# Patient Record
Sex: FEMALE | Race: WHITE | NOT HISPANIC OR LATINO | URBAN - METROPOLITAN AREA
[De-identification: names, ages, dates, MRNs, and addresses within clinical notes are randomized per-mention and may not be internally consistent; named-entity substitution may affect disease eponyms.]

---

## 2023-06-12 ENCOUNTER — OFFICE VISIT (OUTPATIENT)
Dept: PRIMARY CARE | Facility: CLINIC | Age: 48
End: 2023-06-12
Payer: COMMERCIAL

## 2023-06-12 VITALS
TEMPERATURE: 97.8 F | HEART RATE: 73 BPM | DIASTOLIC BLOOD PRESSURE: 74 MMHG | OXYGEN SATURATION: 98 % | BODY MASS INDEX: 24.91 KG/M2 | HEIGHT: 66 IN | WEIGHT: 155 LBS | SYSTOLIC BLOOD PRESSURE: 130 MMHG

## 2023-06-12 DIAGNOSIS — Z12.11 ENCOUNTER FOR SCREENING FOR MALIGNANT NEOPLASM OF COLON: ICD-10-CM

## 2023-06-12 DIAGNOSIS — Z00.00 HEALTHCARE MAINTENANCE: Primary | ICD-10-CM

## 2023-06-12 PROBLEM — S06.0XAA CONCUSSION: Status: RESOLVED | Noted: 2023-06-12 | Resolved: 2023-06-12

## 2023-06-12 PROBLEM — L67.8: Status: ACTIVE | Noted: 2023-06-12

## 2023-06-12 PROBLEM — J01.90 ACUTE SINUSITIS: Status: ACTIVE | Noted: 2023-06-12

## 2023-06-12 PROBLEM — J20.9 ACUTE BRONCHITIS: Status: ACTIVE | Noted: 2023-06-12
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
## 2023-06-12 PROBLEM — J45.909 ASTHMA (HHS-HCC): Status: ACTIVE | Noted: 2023-06-12

## 2023-06-12 PROBLEM — S80.861A: Status: ACTIVE | Noted: 2023-06-12

## 2023-06-12 PROBLEM — T15.12XA: Status: ACTIVE | Noted: 2023-06-12

## 2023-06-12 PROBLEM — H52.223 REGULAR ASTIGMATISM OF BOTH EYES: Status: RESOLVED | Noted: 2023-06-12 | Resolved: 2023-06-12

## 2023-06-12 PROBLEM — J02.9 PHARYNGITIS: Status: RESOLVED | Noted: 2023-06-12 | Resolved: 2023-06-12

## 2023-06-12 PROBLEM — A09 INFECTIOUS GASTROENTERITIS: Status: RESOLVED | Noted: 2023-06-12 | Resolved: 2023-06-12

## 2023-06-12 PROBLEM — S80.861A: Status: RESOLVED | Noted: 2023-06-12 | Resolved: 2023-06-12

## 2023-06-12 PROBLEM — J32.9 CHRONIC SINUSITIS: Status: ACTIVE | Noted: 2023-06-12

## 2023-06-12 PROBLEM — A09 INFECTIOUS GASTROENTERITIS: Status: ACTIVE | Noted: 2023-06-12

## 2023-06-12 PROBLEM — L30.9 DERMATITIS: Status: ACTIVE | Noted: 2023-06-12

## 2023-06-12 PROBLEM — O36.80X0 ENCOUNTER TO DETERMINE FETAL VIABILITY OF PREGNANCY (HHS-HCC): Status: RESOLVED | Noted: 2023-06-12 | Resolved: 2023-06-12

## 2023-06-12 PROBLEM — L67.9 HAIR CHANGES: Status: RESOLVED | Noted: 2023-06-12 | Resolved: 2023-06-12

## 2023-06-12 PROBLEM — J20.9 ACUTE BRONCHITIS: Status: RESOLVED | Noted: 2023-06-12 | Resolved: 2023-06-12

## 2023-06-12 PROBLEM — I73.00 RAYNAUD PHENOMENON: Status: ACTIVE | Noted: 2023-06-12
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
## 2023-06-12 PROBLEM — L67.9 HAIR CHANGES: Status: ACTIVE | Noted: 2023-06-12

## 2023-06-12 PROBLEM — T15.12XA: Status: RESOLVED | Noted: 2023-06-12 | Resolved: 2023-06-12

## 2023-06-12 PROBLEM — N97.9 FEMALE INFERTILITY: Status: ACTIVE | Noted: 2023-06-12

## 2023-06-12 PROBLEM — R30.0 DYSURIA: Status: RESOLVED | Noted: 2023-06-12 | Resolved: 2023-06-12

## 2023-06-12 PROBLEM — L30.9 DERMATITIS: Status: RESOLVED | Noted: 2023-06-12 | Resolved: 2023-06-12

## 2023-06-12 PROBLEM — L50.8 URTICARIA, ACUTE: Status: ACTIVE | Noted: 2023-06-12

## 2023-06-12 PROBLEM — S06.0XAA CONCUSSION: Status: ACTIVE | Noted: 2023-06-12

## 2023-06-12 PROBLEM — I73.00 RAYNAUD PHENOMENON: Status: RESOLVED | Noted: 2023-06-12 | Resolved: 2023-06-12

## 2023-06-12 PROBLEM — N97.9 FEMALE INFERTILITY: Status: RESOLVED | Noted: 2023-06-12 | Resolved: 2023-06-12

## 2023-06-12 PROBLEM — H52.223 REGULAR ASTIGMATISM OF BOTH EYES: Status: ACTIVE | Noted: 2023-06-12

## 2023-06-12 PROBLEM — O36.80X0 ENCOUNTER TO DETERMINE FETAL VIABILITY OF PREGNANCY (HHS-HCC): Status: ACTIVE | Noted: 2023-06-12

## 2023-06-12 PROBLEM — L67.8: Status: RESOLVED | Noted: 2023-06-12 | Resolved: 2023-06-12

## 2023-06-12 PROBLEM — R30.0 DYSURIA: Status: ACTIVE | Noted: 2023-06-12

## 2023-06-12 PROBLEM — J02.9 PHARYNGITIS: Status: ACTIVE | Noted: 2023-06-12

## 2023-06-12 PROBLEM — J01.90 ACUTE SINUSITIS: Status: RESOLVED | Noted: 2023-06-12 | Resolved: 2023-06-12

## 2023-06-12 PROCEDURE — 99396 PREV VISIT EST AGE 40-64: CPT | Performed by: STUDENT IN AN ORGANIZED HEALTH CARE EDUCATION/TRAINING PROGRAM

## 2023-06-12 PROCEDURE — 1036F TOBACCO NON-USER: CPT | Performed by: STUDENT IN AN ORGANIZED HEALTH CARE EDUCATION/TRAINING PROGRAM

## 2023-06-12 RX ORDER — OLOPATADINE HYDROCHLORIDE 665 UG/1
SPRAY NASAL
COMMUNITY
Start: 2023-01-28

## 2023-06-12 RX ORDER — HYDROXYZINE PAMOATE 25 MG/1
CAPSULE ORAL
COMMUNITY
Start: 2021-12-27

## 2023-06-12 RX ORDER — LEVONORGESTREL AND ETHINYL ESTRADIOL 0.1-0.02MG
1 KIT ORAL DAILY
COMMUNITY

## 2023-06-12 RX ORDER — ALBUTEROL SULFATE 90 UG/1
AEROSOL, METERED RESPIRATORY (INHALATION)
COMMUNITY
Start: 2012-06-20

## 2023-06-12 RX ORDER — MUPIROCIN 20 MG/G
OINTMENT TOPICAL
COMMUNITY
Start: 2023-06-08

## 2023-06-12 RX ORDER — MONTELUKAST SODIUM 10 MG/1
10 TABLET ORAL DAILY
COMMUNITY
Start: 2023-05-08

## 2023-06-12 RX ORDER — BUDESONIDE 0.5 MG/2ML
INHALANT ORAL
COMMUNITY
Start: 2023-05-15

## 2023-06-12 RX ORDER — AZELASTINE HCL 205.5 UG/1
SPRAY, METERED NASAL
COMMUNITY
Start: 2022-12-27

## 2023-06-12 RX ORDER — FLUTICASONE PROPIONATE 50 MCG
SPRAY, SUSPENSION (ML) NASAL
COMMUNITY
Start: 2023-02-10

## 2023-06-12 RX ORDER — TRIAMCINOLONE ACETONIDE 1 MG/G
CREAM TOPICAL
COMMUNITY
Start: 2018-06-19

## 2023-06-12 RX ORDER — FLUTICASONE FUROATE AND VILANTEROL TRIFENATATE 200; 25 UG/1; UG/1
POWDER RESPIRATORY (INHALATION)
COMMUNITY
Start: 2023-04-02

## 2023-06-12 NOTE — PATIENT INSTRUCTIONS
Please stop at the lab ([address]) to complete your blood and/or urine work that I've ordered for you.    I will contact you with the results at my soonest convenience. I strongly urge you to use "[employer] as this is the quickest and easiest way to access your results and recieve my correspondences.    I have ordered Cologuard to screen you for colon cancer. You will receive a kit at home.     See me yearly for a complete physical exam, and sooner as needed for sick visits

## 2023-06-12 NOTE — PROGRESS NOTES
"Subjective   Patient ID: Elisa H Siegler is a 48 y.o. female who presents for Annual Exam.    HPI   Has no acute complaints today. Here for annual visit. Patient exercises vigorously and is concerned that she's plateaued weight wise. She asks for consideration of GLP-1, however her BMI is not above the threshold.     PMHx, FHx, Social Hx, Surg Hx personally reviewed at this appointment. No pertinent findings and/or changes from prior (if applicable).     ROS: Denies wt gain/loss f/c HA LoC CP SOB NVDC. See HPI above, and scanned sheet (if applicable). All other systems are reviewed and are without complaint.     Review of Systems    Objective   /74   Pulse 73   Temp 36.6 °C (97.8 °F)   Ht 1.676 m (5' 6\")   Wt 70.3 kg (155 lb)   SpO2 98%   BMI 25.02 kg/m²     Physical Exam  Gen: well developed in NAD. AAO x3.  HEENT: NC/AT. Anicteric sclera, symmetric pupils. MMM no thrush.  Neck: Soft, supple. No LAD. No goiter.   CV: RRR nl s1s2 no m/r/g  Pulm: CTAB no w/r/r, good air exchange  GI: soft NTND BS+ no hsm  Ext: WWP no edema  Neuro: II-XII grossly intact, nonfocal systemic findings  MSK: 5/5 strength b/l UE and LE  Gait: unremarkable    Assessment/Plan   In great health.    # Health Maintenance  - routine blood work  - Colon Cancer Screening: due, cologuard ordered  - Mammogram: UTD  - DEXA: Not yet indicated   - Immunizations: UTD  Problem List Items Addressed This Visit    None  Visit Diagnoses       Healthcare maintenance    -  Primary    Relevant Orders    CBC and Auto Differential    Comprehensive Metabolic Panel    Lipid Panel    TSH with reflex to Free T4 if abnormal    Vitamin D, Total    Hemoglobin A1C    Encounter for screening for malignant neoplasm of colon        Relevant Orders    Cologuard® colon cancer screening               "

## 2023-06-13 ENCOUNTER — LAB (OUTPATIENT)
Dept: LAB | Facility: LAB | Age: 48
End: 2023-06-13
Payer: COMMERCIAL

## 2023-06-13 DIAGNOSIS — Z00.00 HEALTHCARE MAINTENANCE: ICD-10-CM

## 2023-06-13 LAB
ALANINE AMINOTRANSFERASE (SGPT) (U/L) IN SER/PLAS: 14 U/L (ref 7–45)
ALBUMIN (G/DL) IN SER/PLAS: 4.1 G/DL (ref 3.4–5)
ALKALINE PHOSPHATASE (U/L) IN SER/PLAS: 43 U/L (ref 33–110)
ANION GAP IN SER/PLAS: 8 MMOL/L (ref 10–20)
ASPARTATE AMINOTRANSFERASE (SGOT) (U/L) IN SER/PLAS: 21 U/L (ref 9–39)
BASOPHILS (10*3/UL) IN BLOOD BY AUTOMATED COUNT: 0.04 X10E9/L (ref 0–0.1)
BASOPHILS/100 LEUKOCYTES IN BLOOD BY AUTOMATED COUNT: 0.6 % (ref 0–2)
BILIRUBIN TOTAL (MG/DL) IN SER/PLAS: 1 MG/DL (ref 0–1.2)
CALCIDIOL (25 OH VITAMIN D3) (NG/ML) IN SER/PLAS: 66 NG/ML
CALCIUM (MG/DL) IN SER/PLAS: 9.4 MG/DL (ref 8.6–10.6)
CARBON DIOXIDE, TOTAL (MMOL/L) IN SER/PLAS: 31 MMOL/L (ref 21–32)
CHLORIDE (MMOL/L) IN SER/PLAS: 104 MMOL/L (ref 98–107)
CHOLESTEROL (MG/DL) IN SER/PLAS: 303 MG/DL (ref 0–199)
CHOLESTEROL IN HDL (MG/DL) IN SER/PLAS: 69.4 MG/DL
CHOLESTEROL/HDL RATIO: 4.4
CREATININE (MG/DL) IN SER/PLAS: 0.83 MG/DL (ref 0.5–1.05)
EOSINOPHILS (10*3/UL) IN BLOOD BY AUTOMATED COUNT: 0.1 X10E9/L (ref 0–0.7)
EOSINOPHILS/100 LEUKOCYTES IN BLOOD BY AUTOMATED COUNT: 1.4 % (ref 0–6)
ERYTHROCYTE DISTRIBUTION WIDTH (RATIO) BY AUTOMATED COUNT: 13.4 % (ref 11.5–14.5)
ERYTHROCYTE MEAN CORPUSCULAR HEMOGLOBIN CONCENTRATION (G/DL) BY AUTOMATED: 32 G/DL (ref 32–36)
ERYTHROCYTE MEAN CORPUSCULAR VOLUME (FL) BY AUTOMATED COUNT: 92 FL (ref 80–100)
ERYTHROCYTES (10*6/UL) IN BLOOD BY AUTOMATED COUNT: 4.52 X10E12/L (ref 4–5.2)
ESTIMATED AVERAGE GLUCOSE FOR HBA1C: 111 MG/DL
GFR FEMALE: 86 ML/MIN/1.73M2
GLUCOSE (MG/DL) IN SER/PLAS: 86 MG/DL (ref 74–99)
HEMATOCRIT (%) IN BLOOD BY AUTOMATED COUNT: 41.6 % (ref 36–46)
HEMOGLOBIN (G/DL) IN BLOOD: 13.3 G/DL (ref 12–16)
HEMOGLOBIN A1C/HEMOGLOBIN TOTAL IN BLOOD: 5.5 %
IMMATURE GRANULOCYTES/100 LEUKOCYTES IN BLOOD BY AUTOMATED COUNT: 0.3 % (ref 0–0.9)
LDL: 207 MG/DL (ref 0–99)
LEUKOCYTES (10*3/UL) IN BLOOD BY AUTOMATED COUNT: 7 X10E9/L (ref 4.4–11.3)
LYMPHOCYTES (10*3/UL) IN BLOOD BY AUTOMATED COUNT: 2.6 X10E9/L (ref 1.2–4.8)
LYMPHOCYTES/100 LEUKOCYTES IN BLOOD BY AUTOMATED COUNT: 37 % (ref 13–44)
MONOCYTES (10*3/UL) IN BLOOD BY AUTOMATED COUNT: 0.42 X10E9/L (ref 0.1–1)
MONOCYTES/100 LEUKOCYTES IN BLOOD BY AUTOMATED COUNT: 6 % (ref 2–10)
NEUTROPHILS (10*3/UL) IN BLOOD BY AUTOMATED COUNT: 3.85 X10E9/L (ref 1.2–7.7)
NEUTROPHILS/100 LEUKOCYTES IN BLOOD BY AUTOMATED COUNT: 54.7 % (ref 40–80)
NRBC (PER 100 WBCS) BY AUTOMATED COUNT: 0 /100 WBC (ref 0–0)
PLATELETS (10*3/UL) IN BLOOD AUTOMATED COUNT: 326 X10E9/L (ref 150–450)
POTASSIUM (MMOL/L) IN SER/PLAS: 4.4 MMOL/L (ref 3.5–5.3)
PROTEIN TOTAL: 7.1 G/DL (ref 6.4–8.2)
SODIUM (MMOL/L) IN SER/PLAS: 139 MMOL/L (ref 136–145)
THYROTROPIN (MIU/L) IN SER/PLAS BY DETECTION LIMIT <= 0.05 MIU/L: 1.69 MIU/L (ref 0.44–3.98)
TRIGLYCERIDE (MG/DL) IN SER/PLAS: 134 MG/DL (ref 0–149)
UREA NITROGEN (MG/DL) IN SER/PLAS: 19 MG/DL (ref 6–23)
VLDL: 27 MG/DL (ref 0–40)

## 2023-06-13 PROCEDURE — 85025 COMPLETE CBC W/AUTO DIFF WBC: CPT

## 2023-06-13 PROCEDURE — 84443 ASSAY THYROID STIM HORMONE: CPT

## 2023-06-13 PROCEDURE — 36415 COLL VENOUS BLD VENIPUNCTURE: CPT

## 2023-06-13 PROCEDURE — 80053 COMPREHEN METABOLIC PANEL: CPT

## 2023-06-13 PROCEDURE — 83036 HEMOGLOBIN GLYCOSYLATED A1C: CPT

## 2023-06-13 PROCEDURE — 82306 VITAMIN D 25 HYDROXY: CPT

## 2023-06-13 PROCEDURE — 80061 LIPID PANEL: CPT

## 2023-06-15 DIAGNOSIS — E78.01 FAMILIAL HYPERCHOLESTEROLEMIA: Primary | ICD-10-CM

## 2023-06-15 RX ORDER — ATORVASTATIN CALCIUM 40 MG/1
40 TABLET, FILM COATED ORAL DAILY
Qty: 90 TABLET | Refills: 3 | Status: SHIPPED | OUTPATIENT
Start: 2023-06-15 | End: 2024-06-09

## 2023-06-15 NOTE — PROGRESS NOTES
LDL greater than 190 again.    Once again extensive discussion about starting a statin. Will start Atorvastatin 40mg. Will repeat lab work in ~4 weeks to check CMP and lipids.

## 2023-07-04 LAB — NONINV COLON CA DNA+OCC BLD SCRN STL QL: NORMAL

## 2023-07-25 LAB — NONINV COLON CA DNA+OCC BLD SCRN STL QL: NEGATIVE

## 2023-10-12 ENCOUNTER — CLINICAL SUPPORT (OUTPATIENT)
Dept: PEDIATRICS | Facility: CLINIC | Age: 48
End: 2023-10-12
Payer: COMMERCIAL

## 2023-10-12 DIAGNOSIS — Z23 FLU VACCINE NEED: Primary | ICD-10-CM

## 2023-10-12 PROCEDURE — 90471 IMMUNIZATION ADMIN: CPT | Performed by: PEDIATRICS

## 2023-10-12 PROCEDURE — 90686 IIV4 VACC NO PRSV 0.5 ML IM: CPT | Performed by: PEDIATRICS

## 2024-01-02 ENCOUNTER — LAB (OUTPATIENT)
Dept: LAB | Facility: LAB | Age: 49
End: 2024-01-02
Payer: COMMERCIAL

## 2024-01-02 DIAGNOSIS — E78.01 FAMILIAL HYPERCHOLESTEROLEMIA: ICD-10-CM

## 2024-01-02 LAB
ALBUMIN SERPL BCP-MCNC: 4 G/DL (ref 3.4–5)
ALP SERPL-CCNC: 47 U/L (ref 33–110)
ALT SERPL W P-5'-P-CCNC: 11 U/L (ref 7–45)
ANION GAP SERPL CALC-SCNC: 11 MMOL/L (ref 10–20)
AST SERPL W P-5'-P-CCNC: 18 U/L (ref 9–39)
BILIRUB SERPL-MCNC: 0.6 MG/DL (ref 0–1.2)
BUN SERPL-MCNC: 20 MG/DL (ref 6–23)
CALCIUM SERPL-MCNC: 8.9 MG/DL (ref 8.6–10.6)
CHLORIDE SERPL-SCNC: 106 MMOL/L (ref 98–107)
CHOLEST SERPL-MCNC: 283 MG/DL (ref 0–199)
CHOLESTEROL/HDL RATIO: 4.1
CO2 SERPL-SCNC: 28 MMOL/L (ref 21–32)
CREAT SERPL-MCNC: 0.77 MG/DL (ref 0.5–1.05)
GFR SERPL CREATININE-BSD FRML MDRD: >90 ML/MIN/1.73M*2
GLUCOSE SERPL-MCNC: 85 MG/DL (ref 74–99)
HDLC SERPL-MCNC: 68.9 MG/DL
LDLC SERPL CALC-MCNC: 190 MG/DL
NON HDL CHOLESTEROL: 214 MG/DL (ref 0–149)
POTASSIUM SERPL-SCNC: 4.2 MMOL/L (ref 3.5–5.3)
PROT SERPL-MCNC: 6.4 G/DL (ref 6.4–8.2)
SODIUM SERPL-SCNC: 141 MMOL/L (ref 136–145)
TRIGL SERPL-MCNC: 121 MG/DL (ref 0–149)
VLDL: 24 MG/DL (ref 0–40)

## 2024-01-02 PROCEDURE — 80053 COMPREHEN METABOLIC PANEL: CPT

## 2024-01-02 PROCEDURE — 80061 LIPID PANEL: CPT

## 2024-01-02 PROCEDURE — 36415 COLL VENOUS BLD VENIPUNCTURE: CPT

## 2024-01-09 ENCOUNTER — TELEMEDICINE (OUTPATIENT)
Dept: PRIMARY CARE | Facility: CLINIC | Age: 49
End: 2024-01-09
Payer: COMMERCIAL

## 2024-01-09 DIAGNOSIS — E78.49 FAMILIAL COMBINED HYPERLIPIDEMIA: Primary | ICD-10-CM

## 2024-01-09 PROCEDURE — 99213 OFFICE O/P EST LOW 20 MIN: CPT | Performed by: STUDENT IN AN ORGANIZED HEALTH CARE EDUCATION/TRAINING PROGRAM

## 2024-01-09 NOTE — PROGRESS NOTES
Subjective   Patient ID: Elisa H Siegler is a 48 y.o. female who presents for No chief complaint on file..    HPI   Re: HLD - Familial HLD. LDL >190. She's known this for years. She is adamant that she won't go on a statin as she's concerned about side effects. Extensive talk today about how ASCVD % is irrelevant as her LDL is >190, she wishes to continue with only conservative lifestyle measures.     Review of Systems    Objective   There were no vitals taken for this visit.    Physical Exam  Gen: Well appearing, AAO x 3.   HEENT: NC/AT. Symmetric pupils on webcam.   Pulm: no evidence of increased work of breathing on webcam  Skin: no blemishes or rashes on webcam       Assessment/Plan   # hyperlipidemia: very elevated, > 190  - pt declines statin  - recommend cardiology consult if she wishes to obtain a second opinion

## 2024-01-15 ENCOUNTER — APPOINTMENT (OUTPATIENT)
Dept: PRIMARY CARE | Facility: CLINIC | Age: 49
End: 2024-01-15
Payer: COMMERCIAL

## 2024-02-18 ENCOUNTER — PATIENT MESSAGE (OUTPATIENT)
Dept: PRIMARY CARE | Facility: CLINIC | Age: 49
End: 2024-02-18
Payer: COMMERCIAL

## 2024-02-18 DIAGNOSIS — E78.49 FAMILIAL COMBINED HYPERLIPIDEMIA: Primary | ICD-10-CM

## 2024-02-19 ENCOUNTER — APPOINTMENT (OUTPATIENT)
Dept: PRIMARY CARE | Facility: CLINIC | Age: 49
End: 2024-02-19
Payer: COMMERCIAL

## 2024-04-19 ENCOUNTER — HOSPITAL ENCOUNTER (OUTPATIENT)
Dept: RADIOLOGY | Facility: CLINIC | Age: 49
Discharge: HOME | End: 2024-04-19
Payer: COMMERCIAL

## 2024-04-19 DIAGNOSIS — E78.49 FAMILIAL COMBINED HYPERLIPIDEMIA: ICD-10-CM

## 2024-04-19 PROCEDURE — 75571 CT HRT W/O DYE W/CA TEST: CPT

## 2024-06-02 ENCOUNTER — LAB REQUISITION (OUTPATIENT)
Dept: LAB | Facility: HOSPITAL | Age: 49
End: 2024-06-02
Payer: COMMERCIAL

## 2024-06-02 DIAGNOSIS — J01.90 ACUTE SINUSITIS, UNSPECIFIED: ICD-10-CM

## 2024-06-02 PROCEDURE — 87651 STREP A DNA AMP PROBE: CPT

## 2024-06-03 LAB — S PYO DNA THROAT QL NAA+PROBE: NOT DETECTED

## 2024-06-13 ENCOUNTER — APPOINTMENT (OUTPATIENT)
Dept: PRIMARY CARE | Facility: CLINIC | Age: 49
End: 2024-06-13
Payer: COMMERCIAL

## 2024-06-17 DIAGNOSIS — E78.49 FAMILIAL COMBINED HYPERLIPIDEMIA: Primary | ICD-10-CM

## 2024-06-17 DIAGNOSIS — Z00.00 HEALTHCARE MAINTENANCE: ICD-10-CM

## 2024-06-18 ENCOUNTER — LAB (OUTPATIENT)
Dept: LAB | Facility: LAB | Age: 49
End: 2024-06-18
Payer: COMMERCIAL

## 2024-06-18 DIAGNOSIS — Z00.00 HEALTHCARE MAINTENANCE: ICD-10-CM

## 2024-06-18 DIAGNOSIS — E78.49 FAMILIAL COMBINED HYPERLIPIDEMIA: ICD-10-CM

## 2024-06-18 LAB
ALBUMIN SERPL BCP-MCNC: 4.1 G/DL (ref 3.4–5)
ALP SERPL-CCNC: 60 U/L (ref 33–110)
ALT SERPL W P-5'-P-CCNC: 12 U/L (ref 7–45)
ANION GAP SERPL CALC-SCNC: 12 MMOL/L (ref 10–20)
AST SERPL W P-5'-P-CCNC: 21 U/L (ref 9–39)
BASOPHILS # BLD AUTO: 0.04 X10*3/UL (ref 0–0.1)
BASOPHILS NFR BLD AUTO: 0.7 %
BILIRUB SERPL-MCNC: 0.7 MG/DL (ref 0–1.2)
BUN SERPL-MCNC: 17 MG/DL (ref 6–23)
CALCIUM SERPL-MCNC: 9 MG/DL (ref 8.6–10.6)
CHLORIDE SERPL-SCNC: 103 MMOL/L (ref 98–107)
CHOLEST SERPL-MCNC: 276 MG/DL (ref 0–199)
CHOLESTEROL/HDL RATIO: 4.8
CO2 SERPL-SCNC: 27 MMOL/L (ref 21–32)
CREAT SERPL-MCNC: 0.72 MG/DL (ref 0.5–1.05)
EGFRCR SERPLBLD CKD-EPI 2021: >90 ML/MIN/1.73M*2
EOSINOPHIL # BLD AUTO: 0.08 X10*3/UL (ref 0–0.7)
EOSINOPHIL NFR BLD AUTO: 1.5 %
ERYTHROCYTE [DISTWIDTH] IN BLOOD BY AUTOMATED COUNT: 13.3 % (ref 11.5–14.5)
GLUCOSE SERPL-MCNC: 65 MG/DL (ref 74–99)
HCT VFR BLD AUTO: 40.5 % (ref 36–46)
HDLC SERPL-MCNC: 57.1 MG/DL
HGB BLD-MCNC: 13 G/DL (ref 12–16)
IMM GRANULOCYTES # BLD AUTO: 0.01 X10*3/UL (ref 0–0.7)
IMM GRANULOCYTES NFR BLD AUTO: 0.2 % (ref 0–0.9)
LDLC SERPL CALC-MCNC: 202 MG/DL
LYMPHOCYTES # BLD AUTO: 2.44 X10*3/UL (ref 1.2–4.8)
LYMPHOCYTES NFR BLD AUTO: 44.9 %
MCH RBC QN AUTO: 29.6 PG (ref 26–34)
MCHC RBC AUTO-ENTMCNC: 32.1 G/DL (ref 32–36)
MCV RBC AUTO: 92 FL (ref 80–100)
MONOCYTES # BLD AUTO: 0.53 X10*3/UL (ref 0.1–1)
MONOCYTES NFR BLD AUTO: 9.7 %
NEUTROPHILS # BLD AUTO: 2.34 X10*3/UL (ref 1.2–7.7)
NEUTROPHILS NFR BLD AUTO: 43 %
NON HDL CHOLESTEROL: 219 MG/DL (ref 0–149)
NRBC BLD-RTO: 0 /100 WBCS (ref 0–0)
PLATELET # BLD AUTO: 298 X10*3/UL (ref 150–450)
POTASSIUM SERPL-SCNC: 4.3 MMOL/L (ref 3.5–5.3)
PROT SERPL-MCNC: 6.6 G/DL (ref 6.4–8.2)
RBC # BLD AUTO: 4.39 X10*6/UL (ref 4–5.2)
SODIUM SERPL-SCNC: 138 MMOL/L (ref 136–145)
TRIGL SERPL-MCNC: 87 MG/DL (ref 0–149)
VLDL: 17 MG/DL (ref 0–40)
WBC # BLD AUTO: 5.4 X10*3/UL (ref 4.4–11.3)

## 2024-06-18 PROCEDURE — 36415 COLL VENOUS BLD VENIPUNCTURE: CPT

## 2024-06-18 PROCEDURE — 85025 COMPLETE CBC W/AUTO DIFF WBC: CPT

## 2024-06-18 PROCEDURE — 80053 COMPREHEN METABOLIC PANEL: CPT

## 2024-06-18 PROCEDURE — 80061 LIPID PANEL: CPT

## 2024-06-20 ENCOUNTER — APPOINTMENT (OUTPATIENT)
Dept: PRIMARY CARE | Facility: CLINIC | Age: 49
End: 2024-06-20
Payer: COMMERCIAL

## 2024-06-20 VITALS
BODY MASS INDEX: 24.66 KG/M2 | SYSTOLIC BLOOD PRESSURE: 117 MMHG | TEMPERATURE: 96.9 F | HEIGHT: 62 IN | OXYGEN SATURATION: 98 % | HEART RATE: 73 BPM | DIASTOLIC BLOOD PRESSURE: 73 MMHG | WEIGHT: 134 LBS

## 2024-06-20 DIAGNOSIS — E78.49 FAMILIAL COMBINED HYPERLIPIDEMIA: Primary | ICD-10-CM

## 2024-06-20 DIAGNOSIS — Z00.00 HEALTHCARE MAINTENANCE: ICD-10-CM

## 2024-06-20 DIAGNOSIS — J45.20 MILD INTERMITTENT ASTHMA WITHOUT COMPLICATION (HHS-HCC): ICD-10-CM

## 2024-06-20 PROCEDURE — 99396 PREV VISIT EST AGE 40-64: CPT | Performed by: STUDENT IN AN ORGANIZED HEALTH CARE EDUCATION/TRAINING PROGRAM

## 2024-06-20 PROCEDURE — 1036F TOBACCO NON-USER: CPT | Performed by: STUDENT IN AN ORGANIZED HEALTH CARE EDUCATION/TRAINING PROGRAM

## 2024-06-20 ASSESSMENT — PAIN SCALES - GENERAL: PAINLEVEL: 0-NO PAIN

## 2024-06-20 NOTE — PROGRESS NOTES
"Subjective   Patient ID: Elisa H Siegler is a 49 y.o. female who presents for No chief complaint on file..    HPI     Doing great since last in!    Re: wt - BMI now less than 25. In with a wellness clinic. On tirzapetide through a compounding pharmacy. Diet and exercise are goodl    Re: HLD - LDL > 190. She remains adamant on not wanting a statin. Willing to meet with cardiology to discuss if there is a different class of medication (ex; Repatha) she will qualify for.     Re: HM - shots UTD. CRS and Mamm UTD.     Review of Systems    Objective   /73   Pulse 73   Temp 36.1 °C (96.9 °F)   Ht 1.575 m (5' 2\")   Wt 60.8 kg (134 lb)   SpO2 98%   BMI 24.51 kg/m²     Physical Exam  Gen: well developed in NAD. AAO x3.  HEENT: NC/AT. Anicteric sclera, symmetric pupils. MMM no thrush.  Neck: Soft, supple. No LAD. No goiter.   CV: RRR nl s1s2 no m/r/g  Pulm: CTAB no w/r/r, good air exchange  GI: soft NTND BS+ no hsm  Ext: WWP no edema  Neuro: II-XII grossly intact, nonfocal systemic findings  MSK: 5/5 strength b/l UE and LE  Gait: unremarkable    Lab Results   Component Value Date    WBC 5.4 06/18/2024    HGB 13.0 06/18/2024    HCT 40.5 06/18/2024     06/18/2024    CHOL 276 (H) 06/18/2024    TRIG 87 06/18/2024    HDL 57.1 06/18/2024    ALT 12 06/18/2024    AST 21 06/18/2024     06/18/2024    K 4.3 06/18/2024     06/18/2024    CREATININE 0.72 06/18/2024    BUN 17 06/18/2024    CO2 27 06/18/2024    TSH 1.69 06/13/2023    HGBA1C 5.5 06/13/2023     par       Assessment/Plan   In great health.     # LDL > 190  - referral to Cardiology (Alvarez King) to discuss lipid lowering treatment    # Health Maintenance  - routine blood work  - Colon Cancer Screening: UTD, repeat 2026 (Cologuard)  - Mammogram: UTD  - DEXA: Not yet indicated   - Immunizations: UTD         "

## 2024-06-20 NOTE — PATIENT INSTRUCTIONS
Please stop at the lab ([address]) to complete your blood and/or urine work that I've ordered for you.    I will contact you with the results at my soonest convenience. I strongly urge you to use [employer] as this is the quickest and easiest way to access your results and receive my correspondences.    I have referred you to a Dr. Alvarez King, a cardiologist for further discussion about lowering your LDL.    See me yearly for a complete physical exam, and sooner as needed for sick visits

## 2024-08-13 RX ORDER — TRETINOIN 0.25 MG/G
CREAM TOPICAL
COMMUNITY
Start: 2024-06-17

## 2024-08-21 ENCOUNTER — OFFICE VISIT (OUTPATIENT)
Dept: CARDIOLOGY | Facility: CLINIC | Age: 49
End: 2024-08-21
Payer: COMMERCIAL

## 2024-08-21 VITALS
DIASTOLIC BLOOD PRESSURE: 87 MMHG | WEIGHT: 125 LBS | HEIGHT: 62 IN | BODY MASS INDEX: 23 KG/M2 | SYSTOLIC BLOOD PRESSURE: 130 MMHG | HEART RATE: 77 BPM | OXYGEN SATURATION: 100 %

## 2024-08-21 DIAGNOSIS — E78.49 FAMILIAL COMBINED HYPERLIPIDEMIA: Primary | ICD-10-CM

## 2024-08-21 DIAGNOSIS — E78.5 HYPERLIPIDEMIA, UNSPECIFIED HYPERLIPIDEMIA TYPE: ICD-10-CM

## 2024-08-21 PROCEDURE — 93005 ELECTROCARDIOGRAM TRACING: CPT | Performed by: INTERNAL MEDICINE

## 2024-08-21 PROCEDURE — 1036F TOBACCO NON-USER: CPT | Performed by: INTERNAL MEDICINE

## 2024-08-21 PROCEDURE — 99214 OFFICE O/P EST MOD 30 MIN: CPT | Performed by: INTERNAL MEDICINE

## 2024-08-21 PROCEDURE — 3008F BODY MASS INDEX DOCD: CPT | Performed by: INTERNAL MEDICINE

## 2024-08-21 PROCEDURE — 99204 OFFICE O/P NEW MOD 45 MIN: CPT | Performed by: INTERNAL MEDICINE

## 2024-08-21 RX ORDER — ROSUVASTATIN CALCIUM 10 MG/1
10 TABLET, COATED ORAL DAILY
Qty: 30 TABLET | Refills: 11 | Status: SHIPPED | OUTPATIENT
Start: 2024-08-21 | End: 2025-08-21

## 2024-08-21 ASSESSMENT — ENCOUNTER SYMPTOMS
GASTROINTESTINAL NEGATIVE: 1
PSYCHIATRIC NEGATIVE: 1
CARDIOVASCULAR NEGATIVE: 1
HEMATOLOGIC/LYMPHATIC NEGATIVE: 1
RESPIRATORY NEGATIVE: 1
CONSTITUTIONAL NEGATIVE: 1
MUSCULOSKELETAL NEGATIVE: 1
NEUROLOGICAL NEGATIVE: 1
EYES NEGATIVE: 1
ENDOCRINE NEGATIVE: 1
ALLERGIC/IMMUNOLOGIC NEGATIVE: 1

## 2024-08-21 ASSESSMENT — PAIN SCALES - GENERAL: PAINLEVEL: 0-NO PAIN

## 2024-08-21 NOTE — PROGRESS NOTES
Preventive Cardiology Clinic Note    Reason for Visit: Severe hypercholesterolemia  Referring Clinician: Andreas     History of Present Illness: Elisa H Siegler is a 49 y.o. female with severe hypercholesterolemia and possible familial hypercholesterolemia with history of intolerance to atorvastatin who was referred by her PCP to discuss lipid-lowering options to reduce her cardiovascular risk.  She does not have any other chronic medical conditions besides asthma and she does not report any cardiovascular symptoms such as chest pain, shortness of breath, palpitations, or syncope.  She has had high cholesterol for many years and has tried lifestyle modification without significant change in her levels.  She was on atorvastatin for 1 week but developed muscle aches did not feel well on the medication so stopped it.  She is generally hesitant to go on medication use medication long-term.  There is no history of premature coronary artery disease in her family although her uncle did have coronary bypass surgery and both of her parents have high cholesterol.  She is a non-smoker.  She exercises regularly without cardiopulmonary limitation.  She had a coronary artery calcium score of 0.    Past Medical History:  She has a past medical history of Personal history of other diseases of the respiratory system.    Past Surgical History:  She has a past surgical history that includes Mandible surgery (01/19/2015).    Social History:  She reports that she has never smoked. She has never used smokeless tobacco. She reports that she does not drink alcohol and does not use drugs.    Family History:  Family history as above in the HPI.    Allergies:  Molds extract, Grass pollen, Amoxicillin, Corn, and Oats    Outpatient Medications:  Current Outpatient Medications   Medication Instructions    albuterol (ProAir HFA) 90 mcg/actuation inhaler inhalation    budesonide (Pulmicort) 0.5 mg/2 mL nebulizer solution     fluticasone (Flonase)  "50 mcg/actuation nasal spray MILLILITER(S) NASAL    hydrOXYzine pamoate (Vistaril) 25 mg capsule oral    montelukast (SINGULAIR) 10 mg, oral, Daily    mupirocin (Bactroban) 2 % ointment USE 1 APPLICATION(S) DAILY IN SINUS RINSE ADD 1.5 INCHES INTO BOTTLE    olopatadine (Patanase) 0.6 % spray,non-aerosol nasal spray 2 SPRAY(S) 2 TIMES DAILY NASAL    rosuvastatin (CRESTOR) 10 mg, oral, Daily    TIRZEPATIDE, WEIGHT LOSS, SUBQ subcutaneous    tretinoin (Retin-A) 0.025 % cream Apply thin layer to entire face at bedtime.    triamcinolone (Kenalog) 0.1 % cream APPLY SPARINGLY TO AFFECTED AREA(S) 2 TO 3 TIMES DAILY.       Review of Systems:  Review of Systems   Constitutional: Negative.   HENT: Negative.     Eyes: Negative.    Cardiovascular: Negative.    Respiratory: Negative.     Endocrine: Negative.    Hematologic/Lymphatic: Negative.    Skin: Negative.    Musculoskeletal: Negative.    Gastrointestinal: Negative.    Genitourinary: Negative.    Neurological: Negative.    Psychiatric/Behavioral: Negative.     Allergic/Immunologic: Negative.        Last Recorded Vitals:  Vitals:    08/21/24 1535   BP: 130/87   BP Location: Left arm   Patient Position: Sitting   Pulse: 77   SpO2: 100%   Weight: 56.7 kg (125 lb)   Height: 1.575 m (5' 2\")       Physical Examination:  General: Well appearing, well-nourished, in no acute distress.  HEENT: Normocephalic atraumatic, pupils equal and reactive to light, extraocular muscles intact, no conjunctival injection, oropharynx clear without exudates.  Neck: Normal carotid arterial pulses, no arterial bruits, no thyromegaly.  Cardiac: Regular rhythm and normal heart rate.  S1, S2 present and normal.  No murmurs, rubs, or gallops.  PMI is nondisplaced. Jugular venous pulsations are normal.  Pulmonary: Normal breath sounds, no increased work of breathing, no wheezes or crackles.  GI: Normal bowel sounds, abdominal aorta not enlarged, no hepatosplenomegaly, no abdominal bruits.  Lower " "extremities: No cyanosis, clubbing, or edema.  No xanthelasma present. Normal distal pulses.  Skin: Skin intact. No significant rashes or lesions present.  Neuro: Alert and oriented x 3, normal attention and cognition, no focal motor or sensory neurologic deficits.  Psych: Normal affect and mood.  Musculoskeletal: Normal gait normal muscle tone.    Laboratory Studies:  Lab Results   Component Value Date    GLUCOSE 65 (L) 2024    CALCIUM 9.0 2024     2024    K 4.3 2024    CO2 27 2024     2024    BUN 17 2024    CREATININE 0.72 2024     Lab Results   Component Value Date    ALT 12 2024    AST 21 2024    ALKPHOS 60 2024    BILITOT 0.7 2024         Lab Results   Component Value Date    CHOL 276 (H) 2024    CHOL 283 (H) 2024    CHOL 303 (H) 2023     Lab Results   Component Value Date    HDL 57.1 2024    HDL 68.9 2024    HDL 69.4 2023     Lab Results   Component Value Date    LDLCALC 202 (H) 2024    LDLCALC 190 (H) 2024     Lab Results   Component Value Date    TRIG 87 2024    TRIG 121 2024    TRIG 134 2023     No components found for: \"CHOLHDL\"  Lab Results   Component Value Date    HGBA1C 5.5 2023     Cardiology Tests:  EC2024  ECG in the office today shows normal sinus rhythm with ventricular rate 63 bpm, normal intervals and axis, normal ECG.    Cardiac Imaging:  CT cardiac scoring wo IV contrast 2024  LM 0,  LAD 0,  LCx 0,  RCA 0,  Total 0    The 10-year ASCVD risk score (Tatiana MARIN, et al., 2019) is: 1.9%    Values used to calculate the score:      Age: 49 years      Sex: Female      Is Non- : No      Diabetic: No      Tobacco smoker: No      Systolic Blood Pressure: 130 mmHg      Is BP treated: No      HDL Cholesterol: 57.1 mg/dL      Total Cholesterol: 276 mg/dL    Assessment and Plan:  Problem List Items Addressed This Visit  "         Cardiac and Vasculature    Familial combined hyperlipidemia - Primary    Relevant Medications    rosuvastatin (Crestor) 10 mg tablet    Other Relevant Orders    ECG 12 Lead    Referral to Genetics    Hepatic function panel     Other Visit Diagnoses       Hyperlipidemia, unspecified hyperlipidemia type        Relevant Medications    rosuvastatin (Crestor) 10 mg tablet    Other Relevant Orders    Lipid Panel          Elisa H Siegler is a 49 y.o. female with severe hypercholesterolemia and possible familial hypercholesterolemia with history of intolerance to atorvastatin who was referred by her PCP to discuss lipid-lowering options to reduce her cardiovascular risk.  Her overall cardiovascular risk is relatively low except she does have severe hypercholesterolemia which elevates her risk to some degree.  It is unclear whether or not she has a familial hypercholesterolemia related to mutation in the LDL receptor.  We discussed lipid-lowering therapies to reduce her overall cardiovascular risk with options including alternative statin such as rosuvastatin, and nonstatin alternatives such as bempedoic acid or a PCSK9 inhibitor.  I discussed the risks and benefits and potential side effects of all these options and she has chosen to try rosuvastatin first at a relatively low dose with the addition of coenzyme Q 10 to see if she can tolerate the medication and have an expected reduction between 35 to 50% in her LDL cholesterol.  I will check a lipid panel in 3 months as well as hepatic function panel to assess the effectiveness of the medication.  I have also placed a referral to genetics for potential counseling and testing for familial hypercholesterolemia as a positive test will have implications for cascade screening of her family members.  I will see her again in 3 months here in the office for routine follow-up.  Please do not hesitate to contact me with any questions or concerns.    Alvarez King MD, JOSE LUIS,  FACC  Director,  [facility]  Director,  [facility] Program  Associate Professor of Medicine  [employer]

## 2024-08-22 LAB
ATRIAL RATE: 63 BPM
P AXIS: 57 DEGREES
P OFFSET: 183 MS
P ONSET: 138 MS
PR INTERVAL: 162 MS
Q ONSET: 219 MS
QRS COUNT: 11 BEATS
QRS DURATION: 86 MS
QT INTERVAL: 412 MS
QTC CALCULATION(BAZETT): 421 MS
QTC FREDERICIA: 418 MS
R AXIS: 69 DEGREES
T AXIS: 76 DEGREES
T OFFSET: 425 MS
VENTRICULAR RATE: 63 BPM

## 2024-09-16 ENCOUNTER — OFFICE VISIT (OUTPATIENT)
Dept: URGENT CARE | Age: 49
End: 2024-09-16
Payer: COMMERCIAL

## 2024-09-16 VITALS
SYSTOLIC BLOOD PRESSURE: 151 MMHG | DIASTOLIC BLOOD PRESSURE: 87 MMHG | BODY MASS INDEX: 22.86 KG/M2 | HEART RATE: 57 BPM | WEIGHT: 125 LBS | TEMPERATURE: 98 F | RESPIRATION RATE: 16 BRPM | OXYGEN SATURATION: 98 %

## 2024-09-16 DIAGNOSIS — J40 BRONCHITIS: ICD-10-CM

## 2024-09-16 DIAGNOSIS — J45.901 ASTHMA EXACERBATION, MILD (HHS-HCC): ICD-10-CM

## 2024-09-16 DIAGNOSIS — R05.9 COUGH IN ADULT PATIENT: ICD-10-CM

## 2024-09-16 DIAGNOSIS — R09.82 POST-NASAL DISCHARGE: Primary | ICD-10-CM

## 2024-09-16 RX ORDER — DOXYCYCLINE 100 MG/1
100 CAPSULE ORAL 2 TIMES DAILY
Qty: 20 CAPSULE | Refills: 0 | Status: SHIPPED | OUTPATIENT
Start: 2024-09-16 | End: 2024-09-26

## 2024-09-16 RX ORDER — IPRATROPIUM BROMIDE AND ALBUTEROL SULFATE 2.5; .5 MG/3ML; MG/3ML
3 SOLUTION RESPIRATORY (INHALATION) ONCE
Status: COMPLETED | OUTPATIENT
Start: 2024-09-16 | End: 2024-09-16

## 2024-09-16 RX ORDER — METHYLPREDNISOLONE 4 MG/1
TABLET ORAL
Qty: 21 TABLET | Refills: 0 | Status: SHIPPED | OUTPATIENT
Start: 2024-09-16 | End: 2024-09-23

## 2024-09-16 ASSESSMENT — ENCOUNTER SYMPTOMS
EYES NEGATIVE: 1
ENDOCRINE NEGATIVE: 1
SINUS COMPLAINT: 1
GASTROINTESTINAL NEGATIVE: 1
CARDIOVASCULAR NEGATIVE: 1
COUGH: 1
CONSTITUTIONAL NEGATIVE: 1
HEADACHES: 1

## 2024-09-16 NOTE — PROGRESS NOTES
Subjective   Patient ID: Elisa H Siegler is a 49 y.o. female. They present today with a chief complaint of Sinus Problem, Cough, Nasal Congestion, and Headache.    History of Present Illness    Sinus Problem  Associated symptoms: cough and headaches    Cough  Associated symptoms include headaches.   Headache  Associated symptoms: cough        Past Medical History  Allergies as of 09/16/2024 - Reviewed 09/16/2024   Allergen Reaction Noted    Molds extract Itching and Shortness of breath 09/29/2009    Grass pollen Itching 09/09/2010    Amoxicillin Unknown and Diarrhea 09/29/2009    Corn Diarrhea 02/02/2018    Oats Unknown 02/02/2018       (Not in a hospital admission)       Past Medical History:   Diagnosis Date    Personal history of other diseases of the respiratory system     History of chronic sinusitis       Past Surgical History:   Procedure Laterality Date    MANDIBLE SURGERY  01/19/2015    Jaw Surgery        reports that she has never smoked. She has never used smokeless tobacco. She reports that she does not drink alcohol and does not use drugs.    Review of Systems  Review of Systems   Constitutional: Negative.    HENT: Negative.     Eyes: Negative.    Respiratory:  Positive for cough.    Cardiovascular: Negative.    Gastrointestinal: Negative.    Endocrine: Negative.    Genitourinary: Negative.    Neurological:  Positive for headaches.                                  Objective    Vitals:    09/16/24 1056   BP: 151/87   Pulse: 57   Resp: 16   Temp: 36.7 °C (98 °F)   SpO2: 98%   Weight: 56.7 kg (125 lb)     No LMP recorded.    Physical Exam  Constitutional:       Appearance: Normal appearance.   HENT:      Head: Normocephalic.      Right Ear: Tympanic membrane normal.      Left Ear: Tympanic membrane normal.      Nose: Nose normal.      Mouth/Throat:      Mouth: Mucous membranes are dry.      Pharynx: Oropharynx is clear.   Eyes:      Extraocular Movements: Extraocular movements intact.      Pupils: Pupils  are equal, round, and reactive to light.   Cardiovascular:      Rate and Rhythm: Normal rate and regular rhythm.      Pulses: Normal pulses.      Heart sounds: Normal heart sounds.   Pulmonary:      Effort: Pulmonary effort is normal.      Breath sounds: Decreased air movement present. Examination of the right-upper field reveals decreased breath sounds. Examination of the left-upper field reveals decreased breath sounds. Examination of the right-middle field reveals decreased breath sounds. Examination of the left-middle field reveals decreased breath sounds. Examination of the right-lower field reveals decreased breath sounds. Examination of the left-lower field reveals decreased breath sounds. Decreased breath sounds and wheezing present.   Musculoskeletal:         General: Normal range of motion.      Cervical back: Normal range of motion and neck supple.   Lymphadenopathy:      Cervical: Cervical adenopathy present.   Skin:     General: Skin is warm and dry.   Neurological:      General: No focal deficit present.      Mental Status: She is alert and oriented to person, place, and time.   Psychiatric:         Mood and Affect: Mood normal.         Behavior: Behavior normal.         Procedures    Point of Care Test & Imaging Results from this visit  No results found for this visit on 09/16/24.   No results found.    Diagnostic study results (if any) were reviewed by MING Ramirez.    Assessment/Plan   Allergies, medications, history, and pertinent labs/EKGs/Imaging reviewed by IMNG Ramirez.     Medical Decision Making  Refused testing with recommendations  doxycycline sent and nebulizer treatment given in   throw toothbrush away after 48 hours  f/u PCP 1 week  s/s worsen with recommendations, go to ER    Take your antibiotics as directed. Do not stop taking them just because you feel better. You need to take the full course of antibiotics.  Advised to take daily anti-histamines    alternate Tylenol and Motrin PRN for discomfort  gargle with warm water and salt  f/u PCP 2-3 days  normal saline mist spray three times a day and Flonase daily  Strict FU precautions, especially if flying or traveling outside the country  Rest, fluids.  take prescribed medications as directed.  advised BRAT diet, fluids and soft foods  complete atb as directed  take with foods and add probiotics daily  Breathing warm, moist air helps loosen the sticky mucus that may make you feel like you are choking. Other things that may also help include:  Placing a warm, wet washcloth loosely near your nose and mouth.  Filling a humidifier with warm water and breathing in the warm mist.  Coughing helps clear your airways. Take a couple of deep breaths, 2 to 3 times every hour. Deep breaths help open up your lungs.  While lying down, tap your chest gently a few times a day. This helps bring up mucus from the lungs.  If you smoke, now is the time to quit. Do not allow smoking in your home.  Drink plenty of liquids, as long as your provider says it is OK. Avoid dairy products as can increase mucous production  Drink water, juice, or weak tea.  Drink at least 6 to 10 cups (1.5 to 2.5 liters) a day.  Do not drink alcohol.  Get plenty of rest when you go home. If you have trouble sleeping at night, take naps during the day.  .    Seek immediate medical care if:  You have a new or higher fever  You have a fever with a stiff neck or severe headache.  You have new or worse trouble swallowing.  Your sore throat gets much worse on one side.  Your pain becomes much worse on one side of your throat.    Orders and Diagnoses  Diagnoses and all orders for this visit:  Post-nasal discharge  Cough in adult patient  -     ipratropium-albuteroL (Duo-Neb) 0.5-2.5 mg/3 mL nebulizer solution 3 mL  -     doxycycline (Vibramycin) 100 mg capsule; Take 1 capsule (100 mg) by mouth 2 times a day for 10 days. Take with at least 8 ounces (large glass) of  water, do not lie down for 30 minutes after  -     methylPREDNISolone (Medrol Dospak) 4 mg tablets; Take as directed on package.  Asthma exacerbation, mild ([facility]-HCC)  -     ipratropium-albuteroL (Duo-Neb) 0.5-2.5 mg/3 mL nebulizer solution 3 mL  -     doxycycline (Vibramycin) 100 mg capsule; Take 1 capsule (100 mg) by mouth 2 times a day for 10 days. Take with at least 8 ounces (large glass) of water, do not lie down for 30 minutes after  -     methylPREDNISolone (Medrol Dospak) 4 mg tablets; Take as directed on package.  Bronchitis  -     ipratropium-albuteroL (Duo-Neb) 0.5-2.5 mg/3 mL nebulizer solution 3 mL  -     doxycycline (Vibramycin) 100 mg capsule; Take 1 capsule (100 mg) by mouth 2 times a day for 10 days. Take with at least 8 ounces (large glass) of water, do not lie down for 30 minutes after  -     methylPREDNISolone (Medrol Dospak) 4 mg tablets; Take as directed on package.      Medical Admin Record      Follow Up Instructions  PCP 1 week    Patient disposition: Home    Electronically signed by MING Ramirez  11:36 AM

## 2024-10-03 ENCOUNTER — APPOINTMENT (OUTPATIENT)
Dept: PEDIATRICS | Facility: CLINIC | Age: 49
End: 2024-10-03
Payer: COMMERCIAL

## 2024-10-10 ENCOUNTER — OFFICE VISIT (OUTPATIENT)
Dept: URGENT CARE | Age: 49
End: 2024-10-10
Payer: COMMERCIAL

## 2024-10-10 VITALS
DIASTOLIC BLOOD PRESSURE: 79 MMHG | TEMPERATURE: 97.9 F | SYSTOLIC BLOOD PRESSURE: 120 MMHG | RESPIRATION RATE: 18 BRPM | OXYGEN SATURATION: 98 % | HEART RATE: 77 BPM

## 2024-10-10 DIAGNOSIS — R05.9 COUGH, UNSPECIFIED TYPE: ICD-10-CM

## 2024-10-10 DIAGNOSIS — R49.0 HOARSENESS: Primary | ICD-10-CM

## 2024-10-10 LAB
POC RAPID INFLUENZA A: NEGATIVE
POC RAPID INFLUENZA B: NEGATIVE
POC SARS-COV-2 AG BINAX: NORMAL

## 2024-10-10 ASSESSMENT — ENCOUNTER SYMPTOMS
SORE THROAT: 1
VOICE CHANGE: 1

## 2024-10-10 NOTE — PROGRESS NOTES
Subjective   Patient ID: Elisa H Siegler is a 49 y.o. female. They present today with a chief complaint of Hoarseness, Sore Throat, and Chest Pain (TIGHTNESS ).    History of Present Illness  Patient is a 49-year-old female with no reported past medical history who presents to urgent care today with a complaint of flulike symptoms times several days.  She also endorses laryngitis.  She denies any fevers, chills, chest pain or shortness of breath.  No other complaints or concerns mention at this time.      History provided by:  Patient  Sore Throat   Associated symptoms include congestion.   Chest Pain      Past Medical History  Allergies as of 10/10/2024 - Reviewed 10/10/2024   Allergen Reaction Noted    Molds extract Itching and Shortness of breath 09/29/2009    Grass pollen Itching 09/09/2010    Amoxicillin Unknown and Diarrhea 09/29/2009    Corn Diarrhea 02/02/2018    Oats Unknown 02/02/2018       (Not in a hospital admission)         Past Medical History:   Diagnosis Date    Personal history of other diseases of the respiratory system     History of chronic sinusitis       Past Surgical History:   Procedure Laterality Date    MANDIBLE SURGERY  01/19/2015    Jaw Surgery        reports that she has never smoked. She has never used smokeless tobacco. She reports that she does not drink alcohol and does not use drugs.    Review of Systems  Review of Systems   HENT:  Positive for congestion, sore throat and voice change.    All other systems reviewed and are negative.                                 Objective    Vitals:    10/10/24 1743   BP: 120/79   Pulse: 77   Resp: 18   Temp: 36.6 °C (97.9 °F)   SpO2: 98%     No LMP recorded.    Physical Exam  Vitals and nursing note reviewed.   Constitutional:       General: She is not in acute distress.     Appearance: Normal appearance. She is not ill-appearing, toxic-appearing or diaphoretic.   HENT:      Head: Normocephalic and atraumatic.      Right Ear: Tympanic membrane,  ear canal and external ear normal.      Left Ear: Tympanic membrane, ear canal and external ear normal.      Nose: Congestion present.      Mouth/Throat:      Mouth: Mucous membranes are moist.      Pharynx: Posterior oropharyngeal erythema present. No oropharyngeal exudate.   Eyes:      Extraocular Movements: Extraocular movements intact.      Conjunctiva/sclera: Conjunctivae normal.      Pupils: Pupils are equal, round, and reactive to light.   Cardiovascular:      Rate and Rhythm: Normal rate and regular rhythm.      Pulses: Normal pulses.      Heart sounds: Normal heart sounds.   Pulmonary:      Effort: Pulmonary effort is normal. No respiratory distress.      Breath sounds: Normal breath sounds. No stridor. No wheezing, rhonchi or rales.   Chest:      Chest wall: No tenderness.   Musculoskeletal:         General: Normal range of motion.      Cervical back: Normal range of motion and neck supple.   Skin:     General: Skin is warm and dry.      Capillary Refill: Capillary refill takes less than 2 seconds.   Neurological:      General: No focal deficit present.      Mental Status: She is alert and oriented to person, place, and time.   Psychiatric:         Mood and Affect: Mood normal.         Behavior: Behavior normal.         Procedures      Assessment/Plan   Allergies, medications, history, and pertinent labs/EKGs/Imaging reviewed by MING Randolph.     Medical Decision Making  Patient is well appearing, afebrile, non toxic, not hypoxic, and appropriate for outpatient treatment and management at time of evaluation. Patient presents with flulike symptoms as described above. Differential includes but not limited to: COVID, influenza, strep throat, pneumonia, laryngitis, URI, other.  Rapid strep, COVID and influenza all negative.  Low suspicion for pneumonia given normal vitals and clear lung sounds.  Exam, history and lab results consistent with viral infection.  Recommended continued use of  over-the-counter medication as needed for symptom relief and close follow-up with PCP.  Patient is agreement with this plan.  She was discharged in stable condition.  All questions and concerns addressed.      Plan: Discussed differential with the patient. Patient voices understanding and is agreeable to close follow-up with their PCP in the next 2-3 days. They understand they should go to the emergency room immediately for any new, worsening or concerning symptoms. Patient understands return precautions and discharge instructions.      Orders and Diagnoses  There are no diagnoses linked to this encounter.    Medical Admin Record      Follow Up Instructions  No follow-ups on file.    Patient disposition: Home    Electronically signed by [facility]  6:06 PM

## 2024-10-18 ENCOUNTER — APPOINTMENT (OUTPATIENT)
Dept: PEDIATRICS | Facility: CLINIC | Age: 49
End: 2024-10-18
Payer: COMMERCIAL

## 2024-10-25 ENCOUNTER — APPOINTMENT (OUTPATIENT)
Dept: PEDIATRICS | Facility: CLINIC | Age: 49
End: 2024-10-25
Payer: COMMERCIAL

## 2024-11-18 NOTE — PROGRESS NOTES
Genetics Department  [address] [place], [place] [address]  P: [phone]  F: [phone]      Subjective:   Reason for Visit:   Elisa Siegler is a 49 y.o. female who presents to Genetics clinic for an evaluation to rule out a genetic etiology for her history of hyperlipidemia. Alba is being seen in consultation at the request of Dr. King. The information for this visit was obtained from the patient and the medical records.    History of Present Illness: She is referred to Genetics after was found to have persistently elevated LDL between 192-207 from 2018 until this year.    She saw [facility] on 8/21/2024 - she has a diagnosis of severe hypercholesterolemia and has had an intolerance to atorvastatin in the past. She saw Dr. King in [facility], who noted that she is hesitant to go on medication for long-term use. Coronary artery calcium score of 0. Plan at that time was to start low dose rosuvastatin with CoQ10 and further follow up. There may be consideration for bempedoic acid or possibly a PCSK9 inhibitor.     No neuropathy, she has some eye tearing but is seeing the eye doctor today.     Today, she is feeling well and has no acute complaints. She is interested in learning more about a potential genetic cause of her hyperlipidemia, and is curious as to whether or not finding a genetic cause would affect her management. She states that she is following recommended lifestyle modifications (exercise, healthy diet), and there has not been a significant change in her lipid levels. She is interested in trying non-pharmacological options before starting a medication.    Past Medical History:  Alba  has a past medical history of Personal history of other diseases of the respiratory system.    Past Surgical History:  Alba  has a past surgical history that includes Mandible surgery (01/19/2015).     Family History:  A multigenerational family history was obtained as  "part of the visit and pertinent positives and negatives are reviewed here.  The complete pedigree will be scanned into the patient EHR.     Alba has one son with asthma, and a 49 year old brother with elevated blood pressure. Nieces/nephews are a/w.    Maternal: English/Kenyan/Gibraltarian/ Ancestry  Mother is 75, has afib, high cholesterol, palpitations, and needed a hip/knee replacement. Maternal uncle is 74 with elevated BP, had a CABG, knee replacement. One maternal uncle is  in late 50s/early 60s with a stroke and high cholesterol - both children of this uncle have high cholesterol, one has high BP. One female 1st cousin has acid reflux, and others are a/w. Maternal grandfather  age 92 of \"old age\" and had anxiety, maternal grandmother  age 80s with Alzheimers and there was a great aunt with Alzheimers as well.    Paternal: Kenyan/Gibraltarian/ Ancestry  Father with chronic inflammatory demyelinating polyneuropathy (CIDP acronym was provided), high cholesterol, pancreatitis related to medication. Paternal uncle is age 75 with Crohns, afib, testicular cancer, skin cancer \"heart issues\", and a 1st cousin has autoimmune problems. Paternal grandfather is  at an unknown age, had pancreatic cancer, polio, and colitis. Paternal grandmother  in her mid 90s of lung cancer/complications of a fall.    The remainder of the history is negative for congenital anomalies, intellectual disability, multiple miscarriages, and known genetic diseases.  Consanguinity is denied, as is Ashkenazi Evangelical ancestry.    Review of Systems:  A full review of systems was reviewed with the family.  Pertinent positives listed in the HPI.  All other systems negative.      MEDICATIONS:   Current Outpatient Medications:     albuterol (ProAir HFA) 90 mcg/actuation inhaler, Inhale., Disp: , Rfl:     budesonide (Pulmicort) 0.5 mg/2 mL nebulizer solution, , Disp: , Rfl:     fluticasone (Flonase) 50 mcg/actuation " nasal spray, MILLILITER(S) NASAL, Disp: , Rfl:     hydrOXYzine pamoate (Vistaril) 25 mg capsule, Take by mouth., Disp: , Rfl:     montelukast (Singulair) 10 mg tablet, Take 1 tablet (10 mg) by mouth once daily., Disp: , Rfl:     mupirocin (Bactroban) 2 % ointment, USE 1 APPLICATION(S) DAILY IN SINUS RINSE ADD 1.5 INCHES INTO BOTTLE, Disp: , Rfl:     olopatadine (Patanase) 0.6 % spray,non-aerosol nasal spray, 2 SPRAY(S) 2 TIMES DAILY NASAL, Disp: , Rfl:     rosuvastatin (Crestor) 10 mg tablet, Take 1 tablet (10 mg) by mouth once daily., Disp: 30 tablet, Rfl: 11    TIRZEPATIDE, WEIGHT LOSS, SUBQ, Inject under the skin., Disp: , Rfl:     tretinoin (Retin-A) 0.025 % cream, Apply thin layer to entire face at bedtime., Disp: , Rfl:     triamcinolone (Kenalog) 0.1 % cream, APPLY SPARINGLY TO AFFECTED AREA(S) 2 TO 3 TIMES DAILY., Disp: , Rfl:     ALLERGIES:   Alba is allergic to molds extract, grass pollen, amoxicillin, corn, and oats.    Objective:     Physical exam:  Constitutional: No acute distress, patient comfortable appearing  HEENT: NCAT  Respiratory: Lungs CTAB  Cardiovascular: Normal rate, regular rhythm, no murmurs appreciated  Gastrointestinal: Soft,, non-distended, +BS  Musculoskeletal: No joint swelling, no deformity, moves all 4 extremities  Integumentary: Intact, warm, dry no rashes  Neurological: alert, no focal deficits, MAEx4    RESULTS:  Lipid panel, June 18, 2024    Cholesterol  0 - 199 mg/dL 276 High      LDL Calculated  <=99 mg/dL 202 High        Assessment and Plan:   Elisa Siegler is a 49 y.o. female with a past medical history of hyperlipemia who presents to  Genetics for evaluation. She is noted to have severe hypercholesterolemia even after lifestyle modifications. We have yet to see the effect of her low-dose rosuvastatin on her lipids. Regardless, she has a a very elevated LDL and is thus at risk of harboring a pathogenic genetic variant for familial hypercholesterolemia (FH). On the  maternal side, there is a history of high cholesterol, CABG, and stroke, and this could be consistent with FH. Genetic testing could be beneficial in identifying a specific familial variant and testing asymptomatic family members. This testing could inform both Alba's and her family member's medical care, and help provide answers regarding genetic risk.    Plan:  - Draw blood today for [facility] Comprehensive Lipidemia Panel    - Turnaround time 3-4 weeks   - Follow up virtually in 1 month with Dr. Martinez  - Discuss cancer genetics referral for patient or relatives based on family history of pancreatic at next visit.    The patient was consented for disease-related gene panel testing as follows:  Potential results of testing were explicitly discussed with the patient including the possibility and consequences of positive or negative results, finding variants of uncertain significance (VUS), or finding incidental genetic changes associated with other, unrelated medical conditions.  Specifically, discussed that negative results do not necessarily mean that there is not a genetic/heritable cause for this disease.    An appointment can be made by calling [phone].     All results will be discussed with the patient/family at the scheduled follow-up appointment unless other arrangements are made at the time of the visit.      The information we discussed is what is known as of this date. With the rapid pace of medical and genetic research, new discoveries may modify our assessment and approach to this patient and/or family in the future.     All of the patient's/parent's questions were answered and contact information was provided.     Juanjose Figueredo MD, PGY-4 Internal Medicine/Medical Genetics  Supervised by Dr. Juan GARCIA PhD, medical geneticist    Attending Note:  I saw and evaluated the patient. I personally obtained the key and critical portions of the history and physical exam or was physically present for key and  critical portions performed by the resident/fellow. I reviewed the resident/fellow's documentation and discussed the patient with the resident/fellow. I agree with the resident/fellow's medical decision making as documented in the note.    Rayna Martinez MD PhD    I spent 30 minutes in the professional and overall care of this patient.

## 2024-11-20 ENCOUNTER — APPOINTMENT (OUTPATIENT)
Dept: CARDIOLOGY | Facility: CLINIC | Age: 49
End: 2024-11-20
Payer: COMMERCIAL

## 2024-11-21 ENCOUNTER — APPOINTMENT (OUTPATIENT)
Dept: GENETICS | Facility: CLINIC | Age: 49
End: 2024-11-21
Payer: COMMERCIAL

## 2024-11-21 ENCOUNTER — LAB (OUTPATIENT)
Dept: LAB | Facility: LAB | Age: 49
End: 2024-11-21
Payer: COMMERCIAL

## 2024-11-21 VITALS
DIASTOLIC BLOOD PRESSURE: 84 MMHG | SYSTOLIC BLOOD PRESSURE: 126 MMHG | HEIGHT: 62 IN | BODY MASS INDEX: 22.91 KG/M2 | WEIGHT: 124.5 LBS | TEMPERATURE: 97.9 F | HEART RATE: 60 BPM

## 2024-11-21 DIAGNOSIS — E78.49 FAMILIAL COMBINED HYPERLIPIDEMIA: ICD-10-CM

## 2024-11-21 DIAGNOSIS — E78.01 FAMILIAL HYPERCHOLESTEROLEMIA: Primary | ICD-10-CM

## 2024-11-21 PROCEDURE — 3008F BODY MASS INDEX DOCD: CPT | Performed by: MEDICAL GENETICS

## 2024-11-21 PROCEDURE — 99203 OFFICE O/P NEW LOW 30 MIN: CPT | Performed by: MEDICAL GENETICS

## 2024-11-21 PROCEDURE — 9990000009 MISCELLANEOUS GENETICS TEST

## 2024-11-21 ASSESSMENT — ENCOUNTER SYMPTOMS
LOSS OF SENSATION IN FEET: 0
OCCASIONAL FEELINGS OF UNSTEADINESS: 0
DEPRESSION: 0

## 2024-11-21 ASSESSMENT — PATIENT HEALTH QUESTIONNAIRE - PHQ9
1. LITTLE INTEREST OR PLEASURE IN DOING THINGS: NOT AT ALL
SUM OF ALL RESPONSES TO PHQ9 QUESTIONS 1 AND 2: 0
2. FEELING DOWN, DEPRESSED OR HOPELESS: NOT AT ALL

## 2024-11-21 NOTE — LETTER
11/27/24    Alvarez King MD  [address] [facility] [address]  [facility] [address]      Dear Dr. Alvarez King MD,    Thank you for referring your patient, Elisa H Siegler, to receive care in my office. I have enclosed a summary of the care provided to Alba on 11/27/24.    Please contact me with any questions you may have regarding the visit.    Sincerely,         Rayna Martinez MD PhD  [address] [facility] [address]  [place] [address]    CC: No Recipients

## 2024-11-21 NOTE — LETTER
11/27/24    Hector Johns MD  [address] [facility] [address]      Dear Dr. Hector Johns MD,    I am writing to confirm that your patient, Elisa H Siegler  received care in my office on 11/27/24. I have enclosed a summary of the care provided to Alba for your reference.    Please contact me with any questions you may have regarding the visit.    Sincerely,         Rayna Martinez MD PhD  [address] [facility] [address] [facility] [address]  [facility] [address]    CC: No Recipients

## 2024-11-29 LAB
COMMENTS - MP RESULT TYPE: NORMAL
SCAN RESULT: NORMAL

## 2024-12-04 ENCOUNTER — TELEMEDICINE (OUTPATIENT)
Dept: GENETICS | Facility: CLINIC | Age: 49
End: 2024-12-04
Payer: COMMERCIAL

## 2024-12-04 DIAGNOSIS — E78.01: Primary | ICD-10-CM

## 2024-12-05 NOTE — PROGRESS NOTES
Genetics Department  [address] [place], [place] [address]  P: [phone]  F: [phone]       Reason for Visit:   Elisa Siegler is a 49 y.o. female who presents to Genetics clinic for follow-up for her history of hyperlipidemia. The information for this visit was obtained from the patient and the medical records. She is here for a virtual results visit.    This visit was completed via telemedicine (synchronous audio and video). All issues as below were discussed and addressed but no physical exam was performed. If it was felt that the patient should be evaluated in clinic then they were directed there. The patient/parent verbally consented to visit and participated in the visit from a location in [place].    History of Present Illness: Ms Siegler was last seen in Genetics clinic on 11/21/2024.  Please refer to that note for more details.     She has been doing well since our last visit, and has no new complaints.     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 We discussed the implications of this genetic change with Ms. Siegler, and all questions were answered. Relevant discussion points are in the assessment/plan.    Past Medical History:  Alba  has a past surgical history that includes Mandible surgery (01/19/2015).    Family History: Family history reviewed and updated on 12/05/24.  No interval changes.    Review of Systems:  A full review of systems was filled-out and reviewed with the family.  Pertinent positives listed in the HPI.  All other systems negative.    MEDICATIONS:     Current Outpatient Medications:     albuterol (ProAir HFA) 90 mcg/actuation inhaler, Inhale., Disp: , Rfl:     budesonide (Pulmicort) 0.5 mg/2 mL nebulizer solution, , Disp: , Rfl:     fluticasone (Flonase) 50 mcg/actuation nasal spray, MILLILITER(S) NASAL, Disp: , Rfl:      hydrOXYzine pamoate (Vistaril) 25 mg capsule, Take by mouth., Disp: , Rfl:     montelukast (Singulair) 10 mg tablet, Take 1 tablet (10 mg) by mouth once daily., Disp: , Rfl:     mupirocin (Bactroban) 2 % ointment, USE 1 APPLICATION(S) DAILY IN SINUS RINSE ADD 1.5 INCHES INTO BOTTLE, Disp: , Rfl:     olopatadine (Patanase) 0.6 % spray,non-aerosol nasal spray, 2 SPRAY(S) 2 TIMES DAILY NASAL, Disp: , Rfl:     rosuvastatin (Crestor) 10 mg tablet, Take 1 tablet (10 mg) by mouth once daily., Disp: 30 tablet, Rfl: 11    TIRZEPATIDE, WEIGHT LOSS, SUBQ, Inject under the skin., Disp: , Rfl:     tretinoin (Retin-A) 0.025 % cream, Apply thin layer to entire face at bedtime., Disp: , Rfl:     triamcinolone (Kenalog) 0.1 % cream, APPLY SPARINGLY TO AFFECTED AREA(S) 2 TO 3 TIMES DAILY., Disp: , Rfl:     ALLERGIES:   Alba is allergic to molds extract, grass pollen, amoxicillin, corn, and oats.     Objective:     Physical Exam not completed due to virtual nature of the visit.    Assessment and Plan:     Elisa Siegler is a 49 y.o. 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     Pathogenic variants in this gene are associated with autosomal dominant familial hypercholesterolemia, with homozygotes showing more severe disease. Ms. Siegler's range of LDL cholesterol levels is consistent with this genetic change, and this genetic test provides an explanation for her difficult-to-control cholesterol. Patients with pathogenic variants in LDLR are at a higher risk to develop coronary artery disease, and develop complications of elevated cholesterol. We discussed that healthy lifestyle choices are still an important part of her health, but that this genetic finding means that it will be harder to control her cholesterol without the use of medication.    We discussed testing 1st degree relatives for the variant identified in Ms. Siegler. This is available at no cost through [employer] ([employer]) if  the samples are sent within 150 days of her testing. She expressed interest in this, and said she will discuss this with her brother and parents. If they are interested, they can make an appointment with Genetics for genetic counseling. For her son, we discussed that presymptomatic genetic testing is not indicated for this condition (especially for heterozygotes), but that a lipid panel could be considered as early screening. An unremarkable lipid panel does not completely rule out the possibility that her son has inherited this variant, however, and we discussed that he could seek genetic testing once he reaches adulthood.     Regarding family cancer risk, we also discussed Ms. Siegler's paternal grandfather who was diagnosed with pancreatic cancer. We discussed that it is recommended that her father be seen by cancer genetics to discuss possible genetic testing for a cancer predisposition syndrome.    Plan:  - Patient to reach out to family to discuss genetic testing for FH and cancer predisposition genes    Thank you for involving us with the care of Elisa Siegler.      Juanjose Figueredo MD, PGY-4 Internal Medicine/Medical Genetics  Supervised by Dr. Juan GARCIA PhD, Medical Geneticist    Attending Note  I saw and evaluated the patient. I personally obtained the key and critical portions of the history and physical exam or was physically present for key and critical portions performed by the resident/fellow. I reviewed the resident/fellow's documentation and discussed the patient with the resident/fellow. I agree with the resident/fellow's medical decision making as documented in the note.    I spent 25 minutes in the professional and overall care of this patient.    Rayna Martinez MD PhD

## 2025-01-22 ENCOUNTER — APPOINTMENT (OUTPATIENT)
Dept: CARDIOLOGY | Facility: CLINIC | Age: 50
End: 2025-01-22
Payer: COMMERCIAL

## 2025-02-11 ENCOUNTER — OFFICE VISIT (OUTPATIENT)
Dept: URGENT CARE | Age: 50
End: 2025-02-11
Payer: COMMERCIAL

## 2025-02-11 VITALS
OXYGEN SATURATION: 99 % | SYSTOLIC BLOOD PRESSURE: 145 MMHG | DIASTOLIC BLOOD PRESSURE: 83 MMHG | TEMPERATURE: 98.4 F | HEART RATE: 79 BPM

## 2025-02-11 DIAGNOSIS — J10.1 INFLUENZA A: Primary | ICD-10-CM

## 2025-02-11 LAB
POC RAPID INFLUENZA A: POSITIVE
POC RAPID INFLUENZA B: NEGATIVE

## 2025-02-11 RX ORDER — OSELTAMIVIR PHOSPHATE 75 MG/1
75 CAPSULE ORAL EVERY 12 HOURS
Qty: 10 CAPSULE | Refills: 0 | Status: SHIPPED | OUTPATIENT
Start: 2025-02-11 | End: 2025-02-16

## 2025-02-11 NOTE — PROGRESS NOTES
Subjective   History of Present Illness: Elisa H Siegler is a 50 y.o. female. They present today with a chief complaint of Headache and Sinusitis (Head congestion, sinus pressure, right ear was clogged, chills, body aches x few days. ).          Past Medical History  Allergies as of 02/11/2025 - Reviewed 02/11/2025   Allergen Reaction Noted    Molds extract Itching and Shortness of breath 09/29/2009    Grass pollen Itching 09/09/2010    Amoxicillin Unknown and Diarrhea 09/29/2009    Corn Diarrhea 02/02/2018    Oats Unknown 02/02/2018       (Not in a hospital admission)       Past Medical History:   Diagnosis Date    Personal history of other diseases of the respiratory system     History of chronic sinusitis       Past Surgical History:   Procedure Laterality Date    MANDIBLE SURGERY  01/19/2015    Jaw Surgery        reports that she has never smoked. She has never used smokeless tobacco. She reports that she does not drink alcohol and does not use drugs.    Review of Systems  Review of Systems                               Objective    Vitals:    02/11/25 0856   BP: 145/83   BP Location: Left arm   Patient Position: Sitting   BP Cuff Size: Large adult   Pulse: 79   Temp: 36.9 °C (98.4 °F)   TempSrc: Oral   SpO2: 99%     No LMP recorded.    Physical Exam  Vitals reviewed.   Constitutional:       Appearance: She is ill-appearing.   HENT:      Head: Normocephalic and atraumatic.      Right Ear: Ear canal normal. No tenderness. Tympanic membrane is erythematous.      Left Ear: Tympanic membrane and ear canal normal. No tenderness.      Nose: Congestion present.      Mouth/Throat:      Mouth: Mucous membranes are moist.      Pharynx: Oropharynx is clear. Uvula midline. No pharyngeal swelling or posterior oropharyngeal erythema.   Eyes:      Extraocular Movements: Extraocular movements intact.      Conjunctiva/sclera: Conjunctivae normal.      Pupils: Pupils are equal, round, and reactive to light.   Cardiovascular:       Rate and Rhythm: Normal rate and regular rhythm.      Heart sounds: No murmur heard.  Pulmonary:      Effort: Pulmonary effort is normal.      Breath sounds: Normal breath sounds. No decreased breath sounds, wheezing, rhonchi or rales.   Skin:     General: Skin is warm.   Neurological:      Mental Status: She is alert and oriented to person, place, and time.   Psychiatric:         Mood and Affect: Mood normal.         Behavior: Behavior normal.             Point of Care Test & Imaging Results from this visit  Results for orders placed or performed in visit on 02/11/25   POCT Influenza A/B manually resulted   Result Value Ref Range    POC Rapid Influenza A Positive (A) Negative    POC Rapid Influenza B Negative Negative      No results found.    Diagnostic study results (if any) were reviewed by Marimar Sanchez PA-C.    Assessment/Plan   Allergies, medications, history, and pertinent labs/EKGs/Imaging reviewed by Marimar Sanchez PA-C.     Medical Decision Making  Patient has flulike symptoms since last night.  She had a negative COVID test at home.  In-house flu test is positive for flu A.  Patient requested to start on Tamiflu.  I instructed patient to start on DayQuil and NyQuil for symptoms as needed.  -         Patient is educated about their diagnoses.     -          Discussed medications benefits and adverse effects.     -          Answered all patient’s questions.     -          Patient will call 911 or go to the nearest ED if worsen symptoms .     -          Patient is agreeable to the plan of care and is deemed stable upon discharge.     -          Follow up with your primary care provider in two days.    Orders and Diagnoses  Diagnoses and all orders for this visit:  Influenza A  -     POCT Influenza A/B manually resulted  -     oseltamivir (Tamiflu) 75 mg capsule; Take 1 capsule (75 mg) by mouth every 12 hours for 5 days.      Medical Admin Record      Patient disposition: Home    Electronically signed by  Marimar Sanchez PA-C  9:38 AM

## 2025-02-14 ENCOUNTER — OFFICE VISIT (OUTPATIENT)
Dept: URGENT CARE | Age: 50
End: 2025-02-14
Payer: COMMERCIAL

## 2025-02-14 VITALS
TEMPERATURE: 97.9 F | OXYGEN SATURATION: 99 % | HEART RATE: 74 BPM | DIASTOLIC BLOOD PRESSURE: 84 MMHG | SYSTOLIC BLOOD PRESSURE: 122 MMHG | RESPIRATION RATE: 18 BRPM

## 2025-02-14 DIAGNOSIS — H66.001 NON-RECURRENT ACUTE SUPPURATIVE OTITIS MEDIA OF RIGHT EAR WITHOUT SPONTANEOUS RUPTURE OF TYMPANIC MEMBRANE: Primary | ICD-10-CM

## 2025-02-14 RX ORDER — CEFDINIR 300 MG/1
300 CAPSULE ORAL 2 TIMES DAILY
Qty: 10 CAPSULE | Refills: 0 | Status: SHIPPED | OUTPATIENT
Start: 2025-02-14 | End: 2025-02-19

## 2025-02-14 ASSESSMENT — ENCOUNTER SYMPTOMS
VOMITING: 0
HEADACHES: 0
MYALGIAS: 0
SORE THROAT: 0
SHORTNESS OF BREATH: 0
APPETITE CHANGE: 0
NAUSEA: 0
ACTIVITY CHANGE: 0
DEPRESSION: 0
FATIGUE: 0
COUGH: 0
PHOTOPHOBIA: 0
RHINORRHEA: 0
DIARRHEA: 0
SINUS PAIN: 0
DIZZINESS: 0
ARTHRALGIAS: 0
ABDOMINAL PAIN: 0
SINUS PRESSURE: 0

## 2025-02-14 ASSESSMENT — PATIENT HEALTH QUESTIONNAIRE - PHQ9
SUM OF ALL RESPONSES TO PHQ9 QUESTIONS 1 AND 2: 0
1. LITTLE INTEREST OR PLEASURE IN DOING THINGS: NOT AT ALL
2. FEELING DOWN, DEPRESSED OR HOPELESS: NOT AT ALL

## 2025-02-14 NOTE — PATIENT INSTRUCTIONS
Thank you for letting me care for you today.  You have been seen today for an ear infection,please finish all medication even if feeling better.   Please follow up with your primary care provider/pediatrician as directed.   If one is needed, please call [phone].  Please seek care in emergency room for red flags as discussed during visit.

## 2025-02-14 NOTE — PROGRESS NOTES
Subjective   Patient ID: Elisa H Siegler is a 50 y.o. female. They present today with a chief complaint of Earache (Pt. Was seen in clinic 3 days ago and tested positive for flu A, her sinuses still really hurt and her right ear is worsening. ).    History of Present Illness  50-year-old female with a medical history of, but not limited to, chronic sinusitis, asthma, and hyperlipidemia here today for reevaluation.  Patient was seen 3 days ago and diagnosed with influenza A.  Patient was placed on Tamiflu and has been tolerating.  Patient states she has been sick a total of 5 days.  At her last visit she was told her right ear was red and to return if the pain worsens/did not improve.  Patient states pain worsened throughout the night.  OTC medications not helping.  Denies otic trauma, use of ear buds, use of q tips, drainage.  Other symptoms are slowly improving.  Patient states asthma is stable and she has not had to use the rescue inhaler since illness started.      History provided by:  Patient      Past Medical History  Allergies as of 02/14/2025 - Reviewed 02/14/2025   Allergen Reaction Noted    Molds extract Itching and Shortness of breath 09/29/2009    Grass pollen Itching 09/09/2010    Amoxicillin Unknown and Diarrhea 09/29/2009    Corn Diarrhea 02/02/2018    Oats Unknown 02/02/2018       (Not in a hospital admission)       Past Medical History:   Diagnosis Date    Personal history of other diseases of the respiratory system     History of chronic sinusitis       Past Surgical History:   Procedure Laterality Date    MANDIBLE SURGERY  01/19/2015    Jaw Surgery        reports that she has never smoked. She has never used smokeless tobacco. She reports that she does not drink alcohol and does not use drugs.    Review of Systems  Review of Systems   Constitutional:  Negative for activity change, appetite change and fatigue.   HENT:  Negative for congestion, ear pain, rhinorrhea, sinus pressure, sinus pain and  sore throat.    Eyes:  Negative for photophobia and visual disturbance.   Respiratory:  Negative for cough and shortness of breath.    Cardiovascular:  Negative for chest pain and leg swelling.   Gastrointestinal:  Negative for abdominal pain, diarrhea, nausea and vomiting.   Musculoskeletal:  Negative for arthralgias and myalgias.   Skin:  Negative for rash.   Neurological:  Negative for dizziness and headaches.   All other systems reviewed and are negative.                                 Objective    Vitals:    02/14/25 1015   BP: 122/84   BP Location: Left arm   Patient Position: Sitting   BP Cuff Size: Large adult   Pulse: 74   Resp: 18   Temp: 36.6 °C (97.9 °F)   TempSrc: Oral   SpO2: 99%     No LMP recorded.    Physical Exam  Vitals reviewed.   Constitutional:       Appearance: Normal appearance. She is normal weight.   HENT:      Head: Normocephalic and atraumatic.      Right Ear: Ear canal and external ear normal. A middle ear effusion (purlent) is present. No mastoid tenderness. Tympanic membrane is bulging.      Left Ear: Tympanic membrane, ear canal and external ear normal.      Nose: Congestion present.      Mouth/Throat:      Mouth: Mucous membranes are moist.      Pharynx: Oropharynx is clear.   Eyes:      Extraocular Movements: Extraocular movements intact.      Conjunctiva/sclera: Conjunctivae normal.      Pupils: Pupils are equal, round, and reactive to light.   Cardiovascular:      Rate and Rhythm: Normal rate and regular rhythm.      Pulses: Normal pulses.      Heart sounds: Normal heart sounds.   Pulmonary:      Effort: Pulmonary effort is normal.      Breath sounds: Normal breath sounds.   Musculoskeletal:         General: Normal range of motion.      Cervical back: Normal range of motion and neck supple.   Skin:     General: Skin is warm and dry.      Capillary Refill: Capillary refill takes less than 2 seconds.   Neurological:      General: No focal deficit present.      Mental Status: She is  alert and oriented to person, place, and time.         Procedures    Point of Care Test & Imaging Results from this visit  No results found for this visit on 02/14/25.   No results found.    Diagnostic study results (if any) were reviewed by Kristin L Schoenlein, APRN-CNP.    Assessment/Plan   Allergies, medications, history, and pertinent labs/EKGs/Imaging reviewed by Kristin L Schoenlein, APRN-CNP.     Medical Decision Making  VSS, NAD, Nontoxic appearing.  Physical exam as documented above.    Symptoms, history, and exam are consistent with: Acute suppurative otitis media.  Placed patient on Omnicef and encouraged OTC Flonase.    Differential Dx include, however, are not limited to: Otitis externa, TM perforation, mastoiditis     I have a low suspicion for any acute pathologies requiring emergent evaluation and further workup at this time.  I believe patient is safe to discharge home with a low threshold for emergency room as discussed during visit.  We discussed close follow-up with primary care provider/pediatrician. Supportive care discussed.  Medication(s) profile of OTC and Rxed medication(s) if prescribed was (were) reviewed.  All questions answered and addressed.  Patient verbalized understanding.      Orders and Diagnoses  Diagnoses and all orders for this visit:  Non-recurrent acute suppurative otitis media of right ear without spontaneous rupture of tympanic membrane  -     cefdinir (Omnicef) 300 mg capsule; Take 1 capsule (300 mg) by mouth 2 times a day for 5 days.      Medical Admin Record      Patient disposition: Home    Electronically signed by Kristin L Schoenlein, APRN-CNP  11:44 AM

## 2025-03-05 ENCOUNTER — APPOINTMENT (OUTPATIENT)
Dept: CARDIOLOGY | Facility: CLINIC | Age: 50
End: 2025-03-05
Payer: COMMERCIAL

## 2025-04-09 ENCOUNTER — APPOINTMENT (OUTPATIENT)
Dept: CARDIOLOGY | Facility: CLINIC | Age: 50
End: 2025-04-09
Payer: COMMERCIAL

## 2025-06-04 ENCOUNTER — APPOINTMENT (OUTPATIENT)
Dept: CARDIOLOGY | Facility: CLINIC | Age: 50
End: 2025-06-04
Payer: COMMERCIAL

## 2025-06-24 ENCOUNTER — APPOINTMENT (OUTPATIENT)
Dept: PRIMARY CARE | Facility: CLINIC | Age: 50
End: 2025-06-24
Payer: COMMERCIAL

## 2025-06-24 VITALS
OXYGEN SATURATION: 99 % | WEIGHT: 126 LBS | SYSTOLIC BLOOD PRESSURE: 116 MMHG | HEIGHT: 62 IN | DIASTOLIC BLOOD PRESSURE: 81 MMHG | TEMPERATURE: 96.7 F | HEART RATE: 61 BPM | BODY MASS INDEX: 23.19 KG/M2

## 2025-06-24 DIAGNOSIS — Z00.00 HEALTHCARE MAINTENANCE: Primary | ICD-10-CM

## 2025-06-24 DIAGNOSIS — J45.40 MODERATE PERSISTENT ASTHMA, UNCOMPLICATED (HHS-HCC): ICD-10-CM

## 2025-06-24 DIAGNOSIS — J32.9 CHRONIC SINUSITIS, UNSPECIFIED LOCATION: ICD-10-CM

## 2025-06-24 PROBLEM — J45.909 ASTHMA: Status: RESOLVED | Noted: 2023-06-12 | Resolved: 2025-06-24

## 2025-06-24 PROCEDURE — 90750 HZV VACC RECOMBINANT IM: CPT | Performed by: STUDENT IN AN ORGANIZED HEALTH CARE EDUCATION/TRAINING PROGRAM

## 2025-06-24 PROCEDURE — 90471 IMMUNIZATION ADMIN: CPT | Performed by: STUDENT IN AN ORGANIZED HEALTH CARE EDUCATION/TRAINING PROGRAM

## 2025-06-24 PROCEDURE — 99396 PREV VISIT EST AGE 40-64: CPT | Performed by: STUDENT IN AN ORGANIZED HEALTH CARE EDUCATION/TRAINING PROGRAM

## 2025-06-24 PROCEDURE — 3008F BODY MASS INDEX DOCD: CPT | Performed by: STUDENT IN AN ORGANIZED HEALTH CARE EDUCATION/TRAINING PROGRAM

## 2025-06-24 PROCEDURE — 1036F TOBACCO NON-USER: CPT | Performed by: STUDENT IN AN ORGANIZED HEALTH CARE EDUCATION/TRAINING PROGRAM

## 2025-06-24 RX ORDER — PROGESTERONE 100 MG/1
CAPSULE ORAL
COMMUNITY
Start: 2025-06-10

## 2025-06-24 RX ORDER — ATORVASTATIN CALCIUM 40 MG/1
40 TABLET, FILM COATED ORAL DAILY
COMMUNITY

## 2025-06-24 ASSESSMENT — PAIN SCALES - GENERAL: PAINLEVEL_OUTOF10: 0-NO PAIN

## 2025-06-24 NOTE — PATIENT INSTRUCTIONS
Your blood work is up to date; no need for additional draw at this appointment    Your mammogram is current.    You received your first shingrix shot today! Follow up in 2-6 months to complete this two-step series.

## 2025-06-24 NOTE — PROGRESS NOTES
Subjective   Patient ID: Elisa H Siegler is a 50 y.o. female who presents for No chief complaint on file..  History of Present Illness  The patient is a healthy 50-year-old female who presents today for her physical exam.    She reports experiencing intermittent sharp pains in the rib area, accompanied by a sensation of a rib dislocating. She also notes the presence of moles in the same area and expresses concern about the possibility of shingles.    She has undergone extensive testing and is currently under the care of a functional medicine physician. She has initiated atorvastatin therapy, as recommended by Dr. King, and reports no significant adverse effects. She experienced muscle soreness a few weeks after starting the medication but notes that this symptom has since resolved. She is on atorvastatin 40 mg. She has an upcoming appointment with Dr. King in early August 2025 and plans to consult with her healthcare providers before making any changes to the medication.    She is also on tirzepatide, administered once monthly at a dose of 25 mg, and is considering discontinuing this medication as well.    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 She has completed all necessary tests and is currently seeing a functional medicine doctor.    She is currently on hormone replacement therapy, which includes an estradiol patch and nightly progesterone 100 mg.    She has had mammograms and an ultrasound of the left breast, which were normal. She has lost weight, going from 150 pounds to between 126 and 120 pounds. Her Cologuard test was done in 2023.    She reports no major health or life updates other than a job-related building switch coming up in the fall.    She is on Singulair daily.    SOCIAL HISTORY  She is a .      PMHx, FHx, Social Hx, Surg Hx personally reviewed at this appointment. No pertinent findings and/or changes from prior (if applicable).    ROS: Unless  "specified above, pt denies wt gain/loss f/c HA LoC CP SOB NVDC. See HPI above, and scanned sheet (if applicable). All other systems are reviewed and are without complaint.     Objective     /81   Pulse 61   Temp 35.9 °C (96.7 °F)   Ht 1.575 m (5' 2\")   Wt 57.2 kg (126 lb)   SpO2 99%   BMI 23.05 kg/m²      Physical Exam  Gen: well developed in NAD. AAO x3.  HEENT: NC/AT. Anicteric sclera, symmetric pupils. MMM no thrush.  Neck: Soft, supple. No LAD. No goiter.   CV: RRR nl s1s2 no m/r/g  Pulm: CTAB no w/r/r, good air exchange  GI: soft NTND BS+ no hsm  Ext: WWP no edema  Neuro: II-XII grossly intact, nonfocal systemic findings  MSK: 5/5 strength b/l UE and LE  Gait: unremarkable         Lab Results   Component Value Date    WBC 5.4 06/18/2024    HGB 13.0 06/18/2024    HCT 40.5 06/18/2024     06/18/2024    CHOL 276 (H) 06/18/2024    TRIG 87 06/18/2024    HDL 57.1 06/18/2024    ALT 12 06/18/2024    AST 21 06/18/2024     06/18/2024    K 4.3 06/18/2024     06/18/2024    CREATININE 0.72 06/18/2024    BUN 17 06/18/2024    CO2 27 06/18/2024    TSH 1.69 06/13/2023    HGBA1C 5.5 06/13/2023     par     Current Outpatient Medications   Medication Instructions    albuterol (ProAir HFA) 90 mcg/actuation inhaler Inhale.    budesonide (Pulmicort) 0.5 mg/2 mL nebulizer solution     fluticasone (Flonase) 50 mcg/actuation nasal spray MILLILITER(S) NASAL    hydrOXYzine pamoate (Vistaril) 25 mg capsule Take by mouth.    montelukast (SINGULAIR) 10 mg, Daily    mupirocin (Bactroban) 2 % ointment USE 1 APPLICATION(S) DAILY IN SINUS RINSE ADD 1.5 INCHES INTO BOTTLE    olopatadine (Patanase) 0.6 % spray,non-aerosol nasal spray 2 SPRAY(S) 2 TIMES DAILY NASAL    rosuvastatin (CRESTOR) 10 mg, oral, Daily    TIRZEPATIDE, WEIGHT LOSS, SUBQ Inject under the skin.    tretinoin (Retin-A) 0.025 % cream Apply thin layer to entire face at bedtime.    triamcinolone (Kenalog) 0.1 % cream APPLY SPARINGLY TO AFFECTED AREA(S) " 2 TO 3 TIMES DAILY.        ECG 12 Lead  Normal sinus rhythm  Normal ECG  No previous ECGs available  Confirmed by Alvarez King ([id number]) on 8/26/2024 9:50:58 AM           Assessment & Plan  1. Health maintenance: Stable. Weight decreased from 142 to 126 pounds since the last encounter.  - Administer shingles vaccine today; second dose between 2 to 6 months.  - Continue current medication regimen.  - Annual mammograms recommended.  - Repeat Cologuard test scheduled for 2026.    2. Hyperlipidemia: Chronic. LDL consistently above 190 for the last decade. Atorvastatin 40 mg since 05/17/2025.  - Continue atorvastatin 40 mg.  - Follow up with Dr. King in August 2025 to assess medication effectiveness.  - Consider alternative medications if no improvement in cholesterol levels.    3. Hormone replacement therapy: Stable.  - Continue estradiol patch and progesterone 100 mg daily as prescribed.    4. Weight management: Stable. Weight decreased from 150 to 126 pounds.  - Continue tirzepatide 2.5 mg once a month.  - Monitor weight and overall health.    5. Rib pain: Monitor. No indication of shingles.  - Monitor pain and report any changes.    Follow-up  - Follow up with Dr. King in August 2025.          Hector Johns MD       This medical note was created with the assistance of artificial intelligence (AI) for documentation purposes. The content has been reviewed and confirmed by the healthcare provider for accuracy and completeness. Patient consented to the use of audio recording and use of AI during their visit.

## 2025-08-27 ENCOUNTER — APPOINTMENT (OUTPATIENT)
Dept: CARDIOLOGY | Facility: CLINIC | Age: 50
End: 2025-08-27
Payer: COMMERCIAL

## 2025-09-06 ENCOUNTER — OFFICE VISIT (OUTPATIENT)
Dept: URGENT CARE | Age: 50
End: 2025-09-06
Payer: COMMERCIAL

## 2025-09-06 VITALS
SYSTOLIC BLOOD PRESSURE: 112 MMHG | HEART RATE: 75 BPM | RESPIRATION RATE: 16 BRPM | OXYGEN SATURATION: 99 % | TEMPERATURE: 97.9 F | DIASTOLIC BLOOD PRESSURE: 77 MMHG

## 2025-09-06 DIAGNOSIS — B96.89 ACUTE BACTERIAL SINUSITIS: Primary | ICD-10-CM

## 2025-09-06 DIAGNOSIS — J01.90 ACUTE BACTERIAL SINUSITIS: Primary | ICD-10-CM

## 2025-09-06 PROCEDURE — 99070 SPECIAL SUPPLIES PHYS/QHP: CPT

## 2025-09-06 PROCEDURE — 99214 OFFICE O/P EST MOD 30 MIN: CPT

## 2025-09-06 RX ORDER — DOXYCYCLINE 100 MG/1
100 CAPSULE ORAL 2 TIMES DAILY
Qty: 14 CAPSULE | Refills: 0 | Status: SHIPPED | OUTPATIENT
Start: 2025-09-06 | End: 2025-09-13

## 2025-09-06 ASSESSMENT — ENCOUNTER SYMPTOMS
SINUS PRESSURE: 1
SINUS COMPLAINT: 1
SINUS PAIN: 1
SORE THROAT: 1

## 2025-09-06 ASSESSMENT — PAIN SCALES - GENERAL: PAINLEVEL_OUTOF10: 6

## 2025-09-23 ENCOUNTER — APPOINTMENT (OUTPATIENT)
Dept: PRIMARY CARE | Facility: CLINIC | Age: 50
End: 2025-09-23
Payer: COMMERCIAL

## 2025-11-19 ENCOUNTER — APPOINTMENT (OUTPATIENT)
Dept: CARDIOLOGY | Facility: CLINIC | Age: 50
End: 2025-11-19
Payer: COMMERCIAL

## 2026-06-24 ENCOUNTER — APPOINTMENT (OUTPATIENT)
Dept: PRIMARY CARE | Facility: CLINIC | Age: 51
End: 2026-06-24
Payer: COMMERCIAL

## 2026-06-29 ENCOUNTER — APPOINTMENT (OUTPATIENT)
Dept: PRIMARY CARE | Facility: CLINIC | Age: 51
End: 2026-06-29
Payer: COMMERCIAL